# Patient Record
Sex: FEMALE | Race: WHITE | NOT HISPANIC OR LATINO | Employment: OTHER | URBAN - METROPOLITAN AREA
[De-identification: names, ages, dates, MRNs, and addresses within clinical notes are randomized per-mention and may not be internally consistent; named-entity substitution may affect disease eponyms.]

---
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
## 2019-04-12 ENCOUNTER — APPOINTMENT (EMERGENCY)
Dept: CT IMAGING | Facility: HOSPITAL | Age: 19
End: 2019-04-12
Payer: COMMERCIAL

## 2019-04-12 ENCOUNTER — HOSPITAL ENCOUNTER (EMERGENCY)
Facility: HOSPITAL | Age: 19
Discharge: HOME | End: 2019-04-12
Attending: EMERGENCY MEDICINE
Payer: COMMERCIAL

## 2019-04-12 VITALS
DIASTOLIC BLOOD PRESSURE: 69 MMHG | HEART RATE: 94 BPM | OXYGEN SATURATION: 96 % | RESPIRATION RATE: 17 BRPM | TEMPERATURE: 98.6 F | SYSTOLIC BLOOD PRESSURE: 115 MMHG

## 2019-04-12 DIAGNOSIS — R20.0 RT FACIAL NUMBNESS: Primary | ICD-10-CM

## 2019-04-12 DIAGNOSIS — R20.0 RIGHT ARM NUMBNESS: ICD-10-CM

## 2019-04-12 LAB
ALBUMIN SERPL BCP-MCNC: 3.9 G/DL (ref 3.5–5)
ALP SERPL-CCNC: 72 U/L (ref 46–116)
ALT SERPL W P-5'-P-CCNC: 19 U/L (ref 12–78)
ANION GAP SERPL CALCULATED.3IONS-SCNC: 8 MMOL/L (ref 4–13)
AST SERPL W P-5'-P-CCNC: 18 U/L (ref 5–45)
ATRIAL RATE: 106 BPM
BASOPHILS # BLD AUTO: 0.05 THOUSANDS/ΜL (ref 0–0.1)
BASOPHILS NFR BLD AUTO: 1 % (ref 0–1)
BILIRUB DIRECT SERPL-MCNC: 0.08 MG/DL (ref 0–0.2)
BILIRUB SERPL-MCNC: 0.4 MG/DL (ref 0.2–1)
BUN SERPL-MCNC: 11 MG/DL (ref 5–25)
CALCIUM SERPL-MCNC: 9.1 MG/DL (ref 8.3–10.1)
CHLORIDE SERPL-SCNC: 104 MMOL/L (ref 100–108)
CO2 SERPL-SCNC: 26 MMOL/L (ref 21–32)
CREAT SERPL-MCNC: 0.96 MG/DL (ref 0.6–1.3)
CRP SERPL QL: <3 MG/L
EOSINOPHIL # BLD AUTO: 0.12 THOUSAND/ΜL (ref 0–0.61)
EOSINOPHIL NFR BLD AUTO: 1 % (ref 0–6)
ERYTHROCYTE [DISTWIDTH] IN BLOOD BY AUTOMATED COUNT: 13.9 % (ref 11.6–15.1)
ERYTHROCYTE [SEDIMENTATION RATE] IN BLOOD: 9 MM/HOUR (ref 0–20)
GFR SERPL CREATININE-BSD FRML MDRD: 85 ML/MIN/1.73SQ M
GLUCOSE SERPL-MCNC: 117 MG/DL (ref 65–140)
HCG SERPL QL: NEGATIVE
HCT VFR BLD AUTO: 40.3 % (ref 34.8–46.1)
HGB BLD-MCNC: 13.1 G/DL (ref 11.5–15.4)
IMM GRANULOCYTES # BLD AUTO: 0.03 THOUSAND/UL (ref 0–0.2)
IMM GRANULOCYTES NFR BLD AUTO: 0 % (ref 0–2)
LYMPHOCYTES # BLD AUTO: 2.47 THOUSANDS/ΜL (ref 0.6–4.47)
LYMPHOCYTES NFR BLD AUTO: 24 % (ref 14–44)
MAGNESIUM SERPL-MCNC: 2 MG/DL (ref 1.6–2.6)
MCH RBC QN AUTO: 28.7 PG (ref 26.8–34.3)
MCHC RBC AUTO-ENTMCNC: 32.5 G/DL (ref 31.4–37.4)
MCV RBC AUTO: 88 FL (ref 82–98)
MONOCYTES # BLD AUTO: 0.91 THOUSAND/ΜL (ref 0.17–1.22)
MONOCYTES NFR BLD AUTO: 9 % (ref 4–12)
NEUTROPHILS # BLD AUTO: 6.7 THOUSANDS/ΜL (ref 1.85–7.62)
NEUTS SEG NFR BLD AUTO: 65 % (ref 43–75)
NRBC BLD AUTO-RTO: 0 /100 WBCS
P AXIS: 69 DEGREES
PLATELET # BLD AUTO: 350 THOUSANDS/UL (ref 149–390)
PMV BLD AUTO: 9.4 FL (ref 8.9–12.7)
POTASSIUM SERPL-SCNC: 3.7 MMOL/L (ref 3.5–5.3)
PR INTERVAL: 154 MS
PROT SERPL-MCNC: 8.3 G/DL (ref 6.4–8.2)
QRS AXIS: 70 DEGREES
QRSD INTERVAL: 82 MS
QT INTERVAL: 308 MS
QTC INTERVAL: 409 MS
RBC # BLD AUTO: 4.57 MILLION/UL (ref 3.81–5.12)
SODIUM SERPL-SCNC: 138 MMOL/L (ref 136–145)
T WAVE AXIS: 41 DEGREES
TSH SERPL DL<=0.05 MIU/L-ACNC: 0.69 UIU/ML (ref 0.46–3.98)
VENTRICULAR RATE: 106 BPM
WBC # BLD AUTO: 10.28 THOUSAND/UL (ref 4.31–10.16)

## 2019-04-12 PROCEDURE — 86618 LYME DISEASE ANTIBODY: CPT | Performed by: EMERGENCY MEDICINE

## 2019-04-12 PROCEDURE — 96361 HYDRATE IV INFUSION ADD-ON: CPT

## 2019-04-12 PROCEDURE — 93005 ELECTROCARDIOGRAM TRACING: CPT

## 2019-04-12 PROCEDURE — 86140 C-REACTIVE PROTEIN: CPT | Performed by: EMERGENCY MEDICINE

## 2019-04-12 PROCEDURE — 36415 COLL VENOUS BLD VENIPUNCTURE: CPT | Performed by: EMERGENCY MEDICINE

## 2019-04-12 PROCEDURE — 84703 CHORIONIC GONADOTROPIN ASSAY: CPT | Performed by: EMERGENCY MEDICINE

## 2019-04-12 PROCEDURE — 80048 BASIC METABOLIC PNL TOTAL CA: CPT | Performed by: EMERGENCY MEDICINE

## 2019-04-12 PROCEDURE — 70496 CT ANGIOGRAPHY HEAD: CPT

## 2019-04-12 PROCEDURE — 86617 LYME DISEASE ANTIBODY: CPT | Performed by: EMERGENCY MEDICINE

## 2019-04-12 PROCEDURE — 80076 HEPATIC FUNCTION PANEL: CPT | Performed by: EMERGENCY MEDICINE

## 2019-04-12 PROCEDURE — 99285 EMERGENCY DEPT VISIT HI MDM: CPT

## 2019-04-12 PROCEDURE — 93010 ELECTROCARDIOGRAM REPORT: CPT | Performed by: INTERNAL MEDICINE

## 2019-04-12 PROCEDURE — 83735 ASSAY OF MAGNESIUM: CPT | Performed by: EMERGENCY MEDICINE

## 2019-04-12 PROCEDURE — 70498 CT ANGIOGRAPHY NECK: CPT

## 2019-04-12 PROCEDURE — 85652 RBC SED RATE AUTOMATED: CPT | Performed by: EMERGENCY MEDICINE

## 2019-04-12 PROCEDURE — 85025 COMPLETE CBC W/AUTO DIFF WBC: CPT | Performed by: EMERGENCY MEDICINE

## 2019-04-12 PROCEDURE — 99284 EMERGENCY DEPT VISIT MOD MDM: CPT | Performed by: EMERGENCY MEDICINE

## 2019-04-12 PROCEDURE — 84443 ASSAY THYROID STIM HORMONE: CPT | Performed by: EMERGENCY MEDICINE

## 2019-04-12 PROCEDURE — 96360 HYDRATION IV INFUSION INIT: CPT

## 2019-04-12 RX ORDER — ACETAMINOPHEN 325 MG/1
975 TABLET ORAL ONCE
Status: COMPLETED | OUTPATIENT
Start: 2019-04-12 | End: 2019-04-12

## 2019-04-12 RX ADMIN — ACETAMINOPHEN 975 MG: 325 TABLET, FILM COATED ORAL at 19:15

## 2019-04-12 RX ADMIN — IOHEXOL 85 ML: 350 INJECTION, SOLUTION INTRAVENOUS at 18:35

## 2019-04-12 RX ADMIN — SODIUM CHLORIDE 1000 ML: 0.9 INJECTION, SOLUTION INTRAVENOUS at 17:45

## 2019-04-14 LAB
B BURGDOR IGG SER IA-ACNC: 0.55
B BURGDOR IGM SER IA-ACNC: 1.34

## 2019-04-16 LAB
B BURGDOR IGG PATRN SER IB-IMP: NEGATIVE
B BURGDOR IGM PATRN SER IB-IMP: NEGATIVE
B BURGDOR18KD IGG SER QL IB: ABNORMAL
B BURGDOR23KD IGG SER QL IB: ABNORMAL
B BURGDOR23KD IGM SER QL IB: ABNORMAL
B BURGDOR28KD IGG SER QL IB: ABNORMAL
B BURGDOR30KD IGG SER QL IB: ABNORMAL
B BURGDOR39KD IGG SER QL IB: ABNORMAL
B BURGDOR39KD IGM SER QL IB: ABNORMAL
B BURGDOR41KD IGG SER QL IB: ABNORMAL
B BURGDOR41KD IGM SER QL IB: PRESENT
B BURGDOR45KD IGG SER QL IB: ABNORMAL
B BURGDOR58KD IGG SER QL IB: ABNORMAL
B BURGDOR66KD IGG SER QL IB: ABNORMAL
B BURGDOR93KD IGG SER QL IB: ABNORMAL

## 2019-06-05 ENCOUNTER — HOSPITAL ENCOUNTER (EMERGENCY)
Facility: HOSPITAL | Age: 19
Discharge: HOME | End: 2019-06-05
Attending: EMERGENCY MEDICINE | Admitting: EMERGENCY MEDICINE
Payer: COMMERCIAL

## 2019-06-05 VITALS
SYSTOLIC BLOOD PRESSURE: 131 MMHG | OXYGEN SATURATION: 100 % | WEIGHT: 162.26 LBS | RESPIRATION RATE: 18 BRPM | TEMPERATURE: 98.3 F | HEIGHT: 69 IN | DIASTOLIC BLOOD PRESSURE: 73 MMHG | BODY MASS INDEX: 24.03 KG/M2 | HEART RATE: 104 BPM

## 2019-06-05 DIAGNOSIS — M62.838 MUSCLE SPASMS OF NECK: Primary | ICD-10-CM

## 2019-06-05 PROCEDURE — 99283 EMERGENCY DEPT VISIT LOW MDM: CPT | Performed by: EMERGENCY MEDICINE

## 2019-06-05 PROCEDURE — 99283 EMERGENCY DEPT VISIT LOW MDM: CPT

## 2019-06-05 PROCEDURE — 96374 THER/PROPH/DIAG INJ IV PUSH: CPT

## 2019-06-05 RX ORDER — NAPROXEN 500 MG/1
500 TABLET ORAL 2 TIMES DAILY WITH MEALS
Qty: 30 TABLET | Refills: 0 | Status: SHIPPED | OUTPATIENT
Start: 2019-06-05

## 2019-06-05 RX ORDER — METHOCARBAMOL 500 MG/1
500 TABLET, FILM COATED ORAL 2 TIMES DAILY
Qty: 20 TABLET | Refills: 0 | Status: SHIPPED | OUTPATIENT
Start: 2019-06-05

## 2019-06-05 RX ORDER — METHOCARBAMOL 500 MG/1
500 TABLET, FILM COATED ORAL ONCE
Status: COMPLETED | OUTPATIENT
Start: 2019-06-05 | End: 2019-06-05

## 2019-06-05 RX ORDER — KETOROLAC TROMETHAMINE 30 MG/ML
15 INJECTION, SOLUTION INTRAMUSCULAR; INTRAVENOUS ONCE
Status: COMPLETED | OUTPATIENT
Start: 2019-06-05 | End: 2019-06-05

## 2019-06-05 RX ADMIN — KETOROLAC TROMETHAMINE 15 MG: 30 INJECTION, SOLUTION INTRAMUSCULAR at 02:35

## 2019-06-05 RX ADMIN — METHOCARBAMOL TABLETS 500 MG: 500 TABLET, COATED ORAL at 02:37

## 2020-01-20 ENCOUNTER — HOSPITAL ENCOUNTER (EMERGENCY)
Facility: HOSPITAL | Age: 20
Discharge: HOME | End: 2020-01-20
Attending: EMERGENCY MEDICINE | Admitting: EMERGENCY MEDICINE
Payer: COMMERCIAL

## 2020-01-20 VITALS
OXYGEN SATURATION: 98 % | TEMPERATURE: 97.8 F | SYSTOLIC BLOOD PRESSURE: 100 MMHG | HEART RATE: 86 BPM | HEIGHT: 69 IN | DIASTOLIC BLOOD PRESSURE: 61 MMHG | BODY MASS INDEX: 22.11 KG/M2 | WEIGHT: 149.25 LBS | RESPIRATION RATE: 16 BRPM

## 2020-01-20 DIAGNOSIS — J11.1 INFLUENZA-LIKE ILLNESS: Primary | ICD-10-CM

## 2020-01-20 DIAGNOSIS — R19.7 DIARRHEA: ICD-10-CM

## 2020-01-20 DIAGNOSIS — Z3A.14 14 WEEKS GESTATION OF PREGNANCY: ICD-10-CM

## 2020-01-20 DIAGNOSIS — O21.9 NAUSEA/VOMITING IN PREGNANCY: ICD-10-CM

## 2020-01-20 LAB
BILIRUB UR QL STRIP: ABNORMAL
CLARITY UR: CLEAR
COLOR UR: YELLOW
FLUAV RNA NPH QL NAA+PROBE: NORMAL
FLUBV RNA NPH QL NAA+PROBE: NORMAL
GLUCOSE UR STRIP-MCNC: NEGATIVE MG/DL
HGB UR QL STRIP.AUTO: NEGATIVE
KETONES UR STRIP-MCNC: ABNORMAL MG/DL
LEUKOCYTE ESTERASE UR QL STRIP: NEGATIVE
NITRITE UR QL STRIP: NEGATIVE
PH UR STRIP.AUTO: 6 [PH]
PROT UR STRIP-MCNC: NEGATIVE MG/DL
RSV RNA NPH QL NAA+PROBE: NORMAL
SP GR UR STRIP.AUTO: >=1.03 (ref 1–1.03)
UROBILINOGEN UR QL STRIP.AUTO: 0.2 E.U./DL

## 2020-01-20 PROCEDURE — 87147 CULTURE TYPE IMMUNOLOGIC: CPT | Performed by: PHYSICIAN ASSISTANT

## 2020-01-20 PROCEDURE — 87631 RESP VIRUS 3-5 TARGETS: CPT | Performed by: PHYSICIAN ASSISTANT

## 2020-01-20 PROCEDURE — 99283 EMERGENCY DEPT VISIT LOW MDM: CPT | Performed by: PHYSICIAN ASSISTANT

## 2020-01-20 PROCEDURE — 87086 URINE CULTURE/COLONY COUNT: CPT | Performed by: PHYSICIAN ASSISTANT

## 2020-01-20 PROCEDURE — 99284 EMERGENCY DEPT VISIT MOD MDM: CPT

## 2020-01-20 PROCEDURE — 81003 URINALYSIS AUTO W/O SCOPE: CPT | Performed by: PHYSICIAN ASSISTANT

## 2020-01-20 RX ORDER — ONDANSETRON 4 MG/1
4 TABLET, ORALLY DISINTEGRATING ORAL ONCE
Status: COMPLETED | OUTPATIENT
Start: 2020-01-20 | End: 2020-01-20

## 2020-01-20 RX ORDER — ONDANSETRON 4 MG/1
4 TABLET, ORALLY DISINTEGRATING ORAL EVERY 6 HOURS PRN
Qty: 20 TABLET | Refills: 0 | Status: SHIPPED | OUTPATIENT
Start: 2020-01-20

## 2020-01-20 RX ADMIN — ONDANSETRON 4 MG: 4 TABLET, ORALLY DISINTEGRATING ORAL at 11:53

## 2020-01-20 NOTE — ED PROVIDER NOTES
History  Chief Complaint   Patient presents with    Vomiting During Pregnancy     pt c/o vomitting and fever starting last night  pt is 14 weeks pregnant and states "I haven't gotten sick my entire pregnancy"     20year old female with past medical history significant for migraine headaches, currently 14 weeks pregnant presents to Ed with chief complaint of body aches, chills, nausea, vomiting, diarrhea and flu like symptoms  Onset reported as 1 day ago  Location of symptoms  Reported as multiple sites  Quality is reported as aching body pain, nausea with vomiting and watery non bloody diarrhea  Modifiers: eating and drinking exacerbates symptoms  Associated symptoms: positive for nausea and vomiting, positive for fatigue, positive for body aches, positive for diarrhea, positive for fatigue  Positive for myalgias  Denies syncope, denies abdominal pain, denies vaginal bleeding or discharge  Context: patient is 14 weeks pregnant, follows with Enoc Montaño CNM  First pregnancy, no complications thus far  Positive for recent sick contacts with stomach flu  Does report eating lunch meat recently  Denies travel outside the country  Developed nausea, vomiting and diarrhea starting 1 5 days ago  Reviewed past visits via [facility], patient last seen in ED on 6/5/2019 for evaluation of muscle spasm of neck  History provided by:  Patient and relative   used: No        Prior to Admission Medications   Prescriptions Last Dose Informant Patient Reported? Taking?    methocarbamol (ROBAXIN) 500 mg tablet   No No   Sig: Take 1 tablet (500 mg total) by mouth 2 (two) times a day   naproxen (NAPROSYN) 500 mg tablet   No No   Sig: Take 1 tablet (500 mg total) by mouth 2 (two) times a day with meals      Facility-Administered Medications: None       Past Medical History:   Diagnosis Date    Migraine        Past Surgical History:   Procedure Laterality Date    SHOULDER SURGERY Left        History reviewed  No pertinent family history  I have reviewed and agree with the history as documented  Social History     Tobacco Use    Smoking status: Never Smoker    Smokeless tobacco: Never Used   Substance Use Topics    Alcohol use: Not on file     Comment: occasional    Drug use: Never        Review of Systems   Constitutional: Positive for chills and fatigue  Negative for activity change, appetite change, diaphoresis, fever and unexpected weight change  HENT: Positive for congestion and postnasal drip  Negative for dental problem, drooling, ear discharge, ear pain, facial swelling, hearing loss, mouth sores, nosebleeds, rhinorrhea, sinus pressure, sinus pain, sneezing, sore throat, tinnitus, trouble swallowing and voice change  Eyes: Negative for photophobia, pain, discharge, redness and itching  Respiratory: Negative for apnea, cough, choking, chest tightness, shortness of breath, wheezing and stridor  Cardiovascular: Negative for chest pain, palpitations and leg swelling  Gastrointestinal: Positive for diarrhea, nausea and vomiting  Negative for abdominal distention, abdominal pain, anal bleeding, blood in stool, constipation and rectal pain  Endocrine: Negative for cold intolerance, heat intolerance, polydipsia, polyphagia and polyuria  Genitourinary: Negative for decreased urine volume, difficulty urinating, dyspareunia, dysuria, enuresis, flank pain, frequency, genital sores, hematuria, menstrual problem, pelvic pain, urgency, vaginal bleeding, vaginal discharge and vaginal pain  Musculoskeletal: Positive for myalgias  Negative for arthralgias, back pain, gait problem, joint swelling, neck pain and neck stiffness  Skin: Negative for color change, pallor, rash and wound  Allergic/Immunologic: Negative for environmental allergies, food allergies and immunocompromised state  Neurological: Positive for headaches   Negative for dizziness, tremors, seizures, syncope, facial asymmetry, speech difficulty, weakness, light-headedness and numbness  Hematological: Negative for adenopathy  Does not bruise/bleed easily  Psychiatric/Behavioral: Negative for agitation, confusion, decreased concentration and hallucinations  The patient is not nervous/anxious  All other systems reviewed and are negative  Physical Exam  Physical Exam   Constitutional: She is oriented to person, place, and time  She appears well-developed and well-nourished  No distress  /61 (BP Location: Right arm)   Pulse 86   Temp 97 8 °F (36 6 °C) (Oral)   Resp 16   Ht 5' 9" (1 753 m)   Wt 67 7 kg (149 lb 4 oz)   LMP 05/22/2019   SpO2 98%   BMI 22 04 kg/m²      HENT:   Head: Normocephalic and atraumatic  Right Ear: External ear normal    Left Ear: External ear normal    Mouth/Throat: No oropharyngeal exudate  Yellow mucus drainage noted to posterior pharynx  Uvula midline with out deviation  Nasal discharge noted  Mucus membranes moist and pink  No drooling or pooling of secretions  Eyes: Pupils are equal, round, and reactive to light  Conjunctivae and EOM are normal  Right eye exhibits no discharge  Left eye exhibits no discharge  No scleral icterus  Neck: Normal range of motion  Neck supple  No JVD present  No tracheal deviation present  Cardiovascular: Normal rate, regular rhythm, S1 normal and S2 normal    Pulmonary/Chest: Effort normal  No stridor  No respiratory distress  She has no wheezes  She has no rales  She exhibits no tenderness  Breath sounds present bilaterally  Scattered ronchi to upper lung fields noted  Abdominal: Soft  Bowel sounds are normal  She exhibits no distension and no mass  There is no tenderness  There is no rebound and no guarding  No hernia  Musculoskeletal: Normal range of motion  She exhibits no edema, tenderness or deformity  Lymphadenopathy:     She has no cervical adenopathy  Neurological: She is alert and oriented to person, place, and time   She has normal reflexes  She displays normal reflexes  No cranial nerve deficit or sensory deficit  She exhibits normal muscle tone  Coordination normal    Skin: Skin is warm and dry  Capillary refill takes less than 2 seconds  No rash noted  She is not diaphoretic  No erythema  No pallor  Psychiatric: She has a normal mood and affect  Her behavior is normal  Judgment and thought content normal    Nursing note and vitals reviewed  Vital Signs  ED Triage Vitals [01/20/20 1125]   Temperature Pulse Respirations Blood Pressure SpO2   97 8 °F (36 6 °C) 94 16 104/66 100 %      Temp Source Heart Rate Source Patient Position - Orthostatic VS BP Location FiO2 (%)   Oral Monitor Sitting Left arm --      Pain Score       3           Vitals:    01/20/20 1125 01/20/20 1322   BP: 104/66 100/61   Pulse: 94 86   Patient Position - Orthostatic VS: Sitting Lying         Visual Acuity      ED Medications  Medications   ondansetron (ZOFRAN-ODT) dispersible tablet 4 mg (4 mg Oral Given 1/20/20 1153)       Diagnostic Studies  Results Reviewed     Procedure Component Value Units Date/Time    UA w Reflex to Microscopic w Reflex to Culture [[id number]]  (Abnormal) Collected:  01/20/20 1421    Lab Status:  Final result Specimen:  Urine, Clean Catch Updated:  01/20/20 1429     Color, UA Yellow     Clarity, UA Clear     Specific Gravity, UA >=1 030     pH, UA 6 0     Leukocytes, UA Negative     Nitrite, UA Negative     Protein, UA Negative mg/dl      Glucose, UA Negative mg/dl      Ketones, UA >=80 (3+) mg/dl      Urobilinogen, UA 0 2 E U /dl      Bilirubin, UA Small     Blood, UA Negative     URINE COMMENT --    Urine culture [[id number]] Collected:  01/20/20 1421    Lab Status:   In process Specimen:  Urine, Clean Catch Updated:  01/20/20 1429    Influenza A/B and RSV PCR [[id number]]  (Normal) Collected:  01/20/20 1156    Lab Status:  Final result Specimen:  Nose Updated:  01/20/20 1232     INFLUENZA A PCR None Detected     INFLUENZA B PCR None Detected     RSV PCR None Detected    Stool Enteric Bacterial Panel by PCR [[id number]]     Lab Status:  No result Specimen:  Stool                  No orders to display              Procedures  Procedures         ED Course                               MDM  Number of Diagnoses or Management Options  14 weeks gestation of pregnancy:   Diarrhea: new and requires workup  Influenza-like illness: new and requires workup  Nausea/vomiting in pregnancy: new and requires workup  Diagnosis management comments: ddx includes but is not limited to:  URI, RSV, sinusitis, OE, OM, serous otitis media,  flu, allergies, viral syndrome, asthma, bronchitis, pneumonia, UTI, threatened miscarriage        Lab results reviewd  UA remarkable for small bili, negative for blood, negative for nitrates or leukocytes  Flu test negative  I discussed all test results with patient at bedside  Patient symptoms improved after treatment with Zofran here in the emergency department  She was able to tolerate oral intake here in the ER after Zofran  No diarrhea or bowel movements while in the emergency department  Offered outpatient stool culture for further evaluation of possible listeria  Discussed symptoms appear most consistent with flu-like syndrome  Will treat with antiemetics  Rest, encourage hydration  At patient's stool culture  Follow-up with established OBGYN for recheck and further evaluation in 2-3 days  Clinically improved after treatment in the ED  No abdominal pain or vaginal bleeding  Fetal heart tones 150 by RN evaluation  Reviewed reasons to return to ed  Patient verbalized understanding of diagnosis and agreement with discharge plan of care as well as understanding of reasons to return to ed       I have reasonably determine that electronically prescribing a controlled substance would be impractical for the patient to obtain the controlled substance prescribed by electronic prescription or would cause an untimely delay resulting in an adverse impact on the patient's medical condition   Amount and/or Complexity of Data Reviewed  Clinical lab tests: ordered and reviewed  Discussion of test results with the performing providers: yes  Obtain history from someone other than the patient: yes (Relative )  Review and summarize past medical records: yes  Independent visualization of images, tracings, or specimens: yes    Patient Progress  Patient progress: improved        Disposition  Final diagnoses:   Influenza-like illness   14 weeks gestation of pregnancy   Nausea/vomiting in pregnancy   Diarrhea     Time reflects when diagnosis was documented in both MDM as applicable and the Disposition within this note     Time User Action Codes Description Comment    1/20/2020  2:33 PM [facility] Add [R69] Influenza-like illness     1/20/2020  2:33 PM [facility] Add [Z3A 14] 14 weeks gestation of pregnancy     1/20/2020  2:33 PM [facility] Add [O21 9] Nausea/vomiting in pregnancy     1/20/2020  2:33 PM [facility] Add [R19 7] Diarrhea       ED Disposition     ED Disposition Condition Date/Time Comment    Discharge Stable Mon Jan 20, 2020  2:33 PM Wilbert Yao discharge to home/self care              Follow-up Information     Follow up With Specialties Details Why Contact Info Additional Information    Kristal Brooks MD Family Medicine Call in 1 day for further evaluation of symptoms [address] [facility] Emergency Department Emergency Medicine Go to  If symptoms worsen [address] [facility] [id number] [phone] [place] ED, [address], [place], [address] [facility], [address] Call in 1 day for further evaluation of symptoms [phone]  [facility]  [phone]             Discharge Medication List as of 1/20/2020  2:35 PM      START taking these medications    Details ondansetron (ZOFRAN-ODT) 4 mg disintegrating tablet Take 1 tablet (4 mg total) by mouth every 6 (six) hours as needed for nausea or vomiting for up to 20 doses, Starting Mon 1/20/2020, Print         CONTINUE these medications which have NOT CHANGED    Details   methocarbamol (ROBAXIN) 500 mg tablet Take 1 tablet (500 mg total) by mouth 2 (two) times a day, Starting Wed 6/5/2019, Print      naproxen (NAPROSYN) 500 mg tablet Take 1 tablet (500 mg total) by mouth 2 (two) times a day with meals, Starting Wed 6/5/2019, Print           Outpatient Discharge Orders   Stool Enteric Bacterial Panel by PCR   Standing Status: Future Standing Exp   Date: 01/20/21       ED Provider  Electronically Signed by           Derek Nunes PA-C  01/20/20 6211

## 2020-01-20 NOTE — ED NOTES
Pt aware stool sample is needed, unable to provide at this time       Figueroa King, MORAIMA  01/20/20 2018

## 2020-01-22 LAB
BACTERIA UR CULT: ABNORMAL
BACTERIA UR CULT: ABNORMAL